# Patient Record
Sex: FEMALE | Race: WHITE
[De-identification: names, ages, dates, MRNs, and addresses within clinical notes are randomized per-mention and may not be internally consistent; named-entity substitution may affect disease eponyms.]

---

## 2018-01-01 ENCOUNTER — HOSPITAL ENCOUNTER (EMERGENCY)
Dept: HOSPITAL 25 - UCCORT | Age: 38
Discharge: HOME | End: 2018-01-01
Payer: COMMERCIAL

## 2018-01-01 VITALS — DIASTOLIC BLOOD PRESSURE: 61 MMHG | SYSTOLIC BLOOD PRESSURE: 111 MMHG
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
## 2018-01-01 DIAGNOSIS — S61.213A: Primary | ICD-10-CM
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
## 2018-01-01 PROCEDURE — 12031 INTMD RPR S/A/T/EXT 2.5 CM/<: CPT

## 2018-01-01 PROCEDURE — 12001 RPR S/N/AX/GEN/TRNK 2.5CM/<: CPT

## 2018-01-01 PROCEDURE — 10060 I&D ABSCESS SIMPLE/SINGLE: CPT

## 2018-01-01 PROCEDURE — 99201: CPT

## 2018-01-01 PROCEDURE — G0463 HOSPITAL OUTPT CLINIC VISIT: HCPCS

## 2018-01-01 NOTE — UC
Laceration HPI





- HPI Summary


HPI Summary: 





37 female presents to  with complaints of left middle finger laceration that 

she sustained just PTA. Admits to bleeding however has subsided after applying 

pressure and dressing. Patient thoroughly irrigated. States she cut it on a 

large piece of broken glass while doing the dishes. Denies any other injuries. 

Denies nail involvement. No other complaints. No PMHx. No medications PTA. Not 

on blood thinners. States tetanus is UTD. No numbness/tingling. 





- History Of Current Complaint


Chief Complaint: UCLaceration


Stated Complaint: LEFT MIDDLE FINGER LACERATION


Time Seen by Provider: 01/01/18 10:57


Hx Obtained From: Patient


Hx Last Menstrual Period: IUD


Laceration Location: Finger - left middle


Mechanism Of Injury: Sharp Trauma


Onset/Duration: Sudden Onset


Severity: Mild


Pain Intensity: 4


Pain Scale Used: 0-10 Numeric


Aggravating Factors: Other: - touch


Hands: 


  __________________________














  __________________________





 1 - laceration





 2 - laceration





Related History: Dominant Hand Right





- Allergies/Home Medications


Allergies/Adverse Reactions: 


 Allergies











Allergy/AdvReac Type Severity Reaction Status Date / Time


 


No Known Allergies Allergy   Verified 01/01/18 10:56











Home Medications: 


 Home Medications





Iud  01/01/18 [History]











PMH/Surg Hx/FS Hx/Imm Hx





- Additional Past Medical History


Additional PMH: 





Denies DM HTN and asthma





- Surgical History


Surgical History: None





- Family History


Known Family History: Positive: None





- Social History


Alcohol Use: None


Substance Use Type: None


Smoking Status (MU): Never Smoked Tobacco





- Immunization History


Most Recent Influenza Vaccination: NOT 2017


Most Recent Tetanus Shot: 2016





Review of Systems


Constitutional: Negative


Skin: Other - laceration


Respiratory: Negative


Cardiovascular: Negative


Neurological: Negative


All Other Systems Reviewed And Are Negative: Yes





Physical Exam


Triage Information Reviewed: Yes


Appearance: Well-Appearing, No Pain Distress, Well-Nourished


Vital Signs: 


 Initial Vital Signs











Temp  99.1 F   01/01/18 10:53


 


Pulse  94   01/01/18 10:53


 


Resp  16   01/01/18 10:53


 


BP  111/61   01/01/18 10:53


 


Pulse Ox  98   01/01/18 10:53











Vital Signs Reviewed: Yes


Eyes: Positive: Conjunctiva Clear


Respiratory: Positive: Chest non-tender, Lungs clear, Normal breath sounds, No 

respiratory distress


Cardiovascular: Positive: RRR, No Murmur, Pulses Normal - 2+ radial, Brisk 

Capillary Refill - <2 seconds


Musculoskeletal: Positive: Strength Intact, ROM Intact, No Edema


Neurological Exam: Normal - sensation intact


Neurological: Positive: Alert, Muscle Tone Normal


Skin: Positive: Other - 1.5cm linear laceration to left middle distal finger, 

sparing nail bed. well approximated and superficial. minimal bleeding, well 

controlled. no sign of FB present. thoroughly irrigated. no other signs of 

trauma or laceration





Laceration Repair





- Laceration Repair


  ** 1


Description: Linear


Laceration Size After Repair: Length (cm) - 1.5


Contamination/FB Removal: none


Debridement: none


Modified For Repair: No


Cleansing Completed Via Routine Prep: Yes


Irrigation With Pressure Irrigation Device: Yes


Closure Material: Skin Adhesive, SteriStrips - 4


Suture Of: Skin





Laceration Course/Dx





- Course/Dx


Course Of Treatment: laceration was well approximated, superifical and linear 

closed nicely with skin adesive and 4 steri strips. no tendon or bone 

involvment. CMS intact. tetanus was already UTD 2016. no concern for FB. keep 

clean and dry, not concern for infection at this time. apply triple antibiotic, 

let glue and steri strips fall off on own. no other concerns. aware of 

worsening signs and symptoms to watch out for. follow up.





- Differential Dx - Laceration/Wound


Differental Diagnoses: Abscess, Avulsion, Laceration


Provider Diagnoses: laceration left middle finger





Discharge





- Discharge Plan


Condition: Improved


Disposition: HOME


Patient Education Materials:  Laceration (ED), Skin Adhesive Care (ED)


Referrals: 


Shwetha Buenrostro NP [Primary Care Provider] - 


Additional Instructions: 


Keep clean and dry.


Do not remove dressing for 24-48 hours.


After 48 hours gently rinse and soak. Dry off thoroughly.


Apply triple antibiotic ointment.


Do not pick of steri strips or glue, let them fall off themselves.


Any new or worsening symptoms, such as infection or re-opening of wound please 

seek medical attention promptly.


Follow up with PCP.

## 2018-06-04 ENCOUNTER — HOSPITAL ENCOUNTER (OUTPATIENT)
Dept: HOSPITAL 25 - OR | Age: 38
Discharge: HOME | End: 2018-06-04
Attending: ORTHOPAEDIC SURGERY
Payer: COMMERCIAL

## 2018-06-04 VITALS — SYSTOLIC BLOOD PRESSURE: 113 MMHG | DIASTOLIC BLOOD PRESSURE: 77 MMHG

## 2018-06-04 DIAGNOSIS — M25.841: Primary | ICD-10-CM

## 2018-06-04 PROCEDURE — 81025 URINE PREGNANCY TEST: CPT
